# Patient Record
Sex: FEMALE | Race: WHITE | Employment: FULL TIME | URBAN - METROPOLITAN AREA
[De-identification: names, ages, dates, MRNs, and addresses within clinical notes are randomized per-mention and may not be internally consistent; named-entity substitution may affect disease eponyms.]

---

## 2017-01-09 ENCOUNTER — OFFICE VISIT (OUTPATIENT)
Dept: FAMILY MEDICINE | Facility: CLINIC | Age: 32
End: 2017-01-09
Payer: COMMERCIAL

## 2017-01-09 ENCOUNTER — TELEPHONE (OUTPATIENT)
Dept: FAMILY MEDICINE | Facility: CLINIC | Age: 32
End: 2017-01-09

## 2017-01-09 ENCOUNTER — RADIANT APPOINTMENT (OUTPATIENT)
Dept: GENERAL RADIOLOGY | Facility: CLINIC | Age: 32
End: 2017-01-09
Attending: PHYSICIAN ASSISTANT
Payer: COMMERCIAL

## 2017-01-09 VITALS
OXYGEN SATURATION: 98 % | BODY MASS INDEX: 24.59 KG/M2 | WEIGHT: 140 LBS | SYSTOLIC BLOOD PRESSURE: 114 MMHG | TEMPERATURE: 98.1 F | DIASTOLIC BLOOD PRESSURE: 78 MMHG | HEART RATE: 77 BPM

## 2017-01-09 DIAGNOSIS — J20.9 ACUTE BRONCHITIS, UNSPECIFIED ORGANISM: Primary | ICD-10-CM

## 2017-01-09 DIAGNOSIS — R05.9 COUGH: ICD-10-CM

## 2017-01-09 PROCEDURE — 71020 XR CHEST 2 VW: CPT

## 2017-01-09 PROCEDURE — 99213 OFFICE O/P EST LOW 20 MIN: CPT | Performed by: PHYSICIAN ASSISTANT

## 2017-01-09 RX ORDER — BENZONATATE 200 MG/1
200 CAPSULE ORAL 3 TIMES DAILY PRN
Qty: 21 CAPSULE | Refills: 0 | Status: SHIPPED | OUTPATIENT
Start: 2017-01-09

## 2017-01-09 RX ORDER — AZITHROMYCIN 250 MG/1
TABLET, FILM COATED ORAL
Qty: 6 TABLET | Refills: 0 | Status: SHIPPED | OUTPATIENT
Start: 2017-01-09 | End: 2017-02-22

## 2017-01-09 NOTE — TELEPHONE ENCOUNTER
See telephone encounter below. Patient has now been seen 3 times for persistent cough. Was originally seen through [facility], then here on 12/16/16, then through a [facility] last week. She was seen in Urgent Care due to developing some back pain with her cough.    Acute Illness   Acute illness concerns: cough  Onset: 6 weeks     Fever: no     Chills/Sweats: no    Headache (location?): YES    Sinus Pressure:YES    Conjunctivitis:  no    Ear Pain: YES- just feel sensative    Rhinorrhea: YES    Congestion: YES    Sore Throat: no     Cough: YES-non-productive, productive of yellow sputum, same over time    Wheeze: no    Decreased Appetite: no    Nausea: no    Vomiting: no    Diarrhea:  no    Dysuria/Freq.: no    Fatigue/Achiness: YES - cough    Sick/Strep Exposure: no     Therapies Tried and outcome: Tessalon, albuterol, prednisone, Mucinex, Dayquil, Claritin, cough drops - feels that Tessalon, Mucinex and prednisone did help temporarily, but otherwise no change     She is wondering what else she can do, or if she needs something different. Please advise. Thank you.    Nini Mares, RN, BSN  [facility] [place]

## 2017-01-09 NOTE — MR AVS SNAPSHOT
After Visit Summary   1/9/2017    Pratibha Suresh    MRN: [id number]           Patient Information     Date Of Birth          1985        Visit Information        Provider Department      1/9/2017 6:20 PM Klarissa Hu PA-C [facility]        Today's Diagnoses     Acute bronchitis, unspecified organism    -  1     Cough           Care Instructions      Bronchitis, Antibiotic Treatment (Adult)    Bronchitis is an infection of the air passages (bronchial tubes) in your lungs. It often occurs when you have a cold. This illness is contagious during the first few days and is spread through the air by coughing and sneezing, or by direct contact (touching the sick person and then touching your own eyes, nose, or mouth).  Symptoms of bronchitis include cough with mucus (phlegm) and low-grade fever. Bronchitis usually lasts 7 to 14 days. Mild cases can be treated with simple home remedies. More severe infection is treated with an antibiotic.  Home care  Follow these guidelines when caring for yourself at home:    If your symptoms are severe, rest at home for the first 2 to 3 days. When you go back to your usual activities, don't let yourself get too tired.    Do not smoke. Also avoid being exposed to secondhand smoke.    You may use over-the-counter medicines to control fever or pain, unless another medicine was prescribed. (Note: If you have chronic liver or kidney disease or have ever had a stomach ulcer or gastrointestinal bleeding, talk with your healthcare provider before using these medicines. Also talk to your provider if you are taking medicine to prevent blood clots.) Aspirin should never be given to anyone younger than 18 years of age who is ill with a viral infection or fever. It may cause severe liver or brain damage.    Your appetite may be poor, so a light diet is fine. Avoid dehydration by drinking 6 to 8 glasses of fluids per day (such as water, soft drinks, sports drinks,  juices, tea, or soup). Extra fluids will help loosen secretions in the nose and lungs.    Over-the-counter cough, cold, and sore-throat medicines will not shorten the length of the illness, but they may be helpful to reduce symptoms. (Note: Do not use decongestants if you have high blood pressure.)    Finish all antibiotic medicine. Do this even if you are feeling better after only a few days.  Follow-up care  Follow up with your healthcare provider, or as advised. If you had an X-ray or ECG (electrocardiogram), a specialist will review it. You will be notified of any new findings that may affect your care.  Note: If you are age 65 or older, or if you have a chronic lung disease or condition that affects your immune system, or you smoke, talk to your healthcare provider about having pneumococcal vaccinations and a yearly influenza vaccination (flu shot).  When to seek medical advice  Call your healthcare provider right away if any of these occur:    Fever of 100.4 F (38 C) or higher    Coughing up increased amounts of colored sputum    Weakness, drowsiness, headache, facial pain, ear pain, or a stiff neck   Call 911, or get immediate medical care  Contact emergency services right away if any of these occur.    Coughing up blood    Worsening weakness, drowsiness, headache, or stiff neck    Trouble breathing, wheezing, or pain with breathing    0401-7924 [employer]. [address] [place] [address]. All rights reserved. This information is not intended as a substitute for professional medical care. Always follow your healthcare professional's instructions.              Follow-ups after your visit        Who to contact     If you have questions or need follow up information about today's clinic visit or your schedule please contact [facility] [place] directly at [phone].  Normal or non-critical lab and imaging results will be communicated to you by MyChart, letter or phone within 4  "business days after the clinic has received the results. If you do not hear from us within 7 days, please contact the clinic through [employer] or phone. If you have a critical or abnormal lab result, we will notify you by phone as soon as possible.  Submit refill requests through [employer] or call your pharmacy and they will forward the refill request to us. Please allow 3 business days for your refill to be completed.          Additional Information About Your Visit        uControl Information     uControl lets you send messages to your doctor, view your test results, renew your prescriptions, schedule appointments and more. To sign up, go to [email or web address] . Click on \"Log in\" on the left side of the screen, which will take you to the Welcome page. Then click on \"Sign up Now\" on the right side of the page.     You will be asked to enter the access code listed below, as well as some personal information. Please follow the directions to create your username and password.     Your access code is: [email or web address]  Expires: 3/16/2017  8:45 AM     Your access code will  in 90 days. If you need help or a new code, please call your [place] clinic or [phone].        Care EveryWhere ID     This is your Care EveryWhere ID. This could be used by other organizations to access your [place] medical records  [id number]        Your Vitals Were     Pulse Temperature Pulse Oximetry             77 98.1  F (36.7  C) (Oral) 98%          Blood Pressure from Last 3 Encounters:   17 114/78   16 110/62   16 118/72    Weight from Last 3 Encounters:   17 140 lb (63.504 kg)   16 137 lb (62.143 kg)   16 131 lb 12.8 oz (59.784 kg)                 Today's Medication Changes          These changes are accurate as of: 17  7:06 PM.  If you have any questions, ask your nurse or doctor.               Start taking these medicines.        Dose/Directions    azithromycin 250 MG tablet   Commonly " known as:  ZITHROMAX   Used for:  Acute bronchitis, unspecified organism   Started by:  Klarissa Hu PA-C        Two tablets first day, then one tablet daily for four days.   Quantity:  6 tablet   Refills:  0         These medicines have changed or have updated prescriptions.        Dose/Directions    * benzonatate 100 MG capsule   Commonly known as:  TESSALON   This may have changed:  Another medication with the same name was added. Make sure you understand how and when to take each.   Changed by:  Klarissa Hu PA-C        SWALLOW WHOLE 1 CAPSULE BY MOUTH 3 TIMES A DAY AS NEEDED. DO NOT BREAK,CHEW, DISSOLVE,CUT OR CRUSH   Refills:  0       * benzonatate 200 MG capsule   Commonly known as:  TESSALON   This may have changed:  You were already taking a medication with the same name, and this prescription was added. Make sure you understand how and when to take each.   Used for:  Acute bronchitis, unspecified organism   Changed by:  Klarissa Hu PA-C        Dose:  200 mg   Take 1 capsule (200 mg) by mouth 3 times daily as needed for cough   Quantity:  21 capsule   Refills:  0       * Notice:  This list has 2 medication(s) that are the same as other medications prescribed for you. Read the directions carefully, and ask your doctor or other care provider to review them with you.         Where to get your medicines      These medications were sent to [facility] [address] [place] [facility] [place] [place] [facility] [address] [facility] [address]  [address] [facility] [address], [place] [address]     Phone:  [phone]    - azithromycin 250 MG tablet  - benzonatate 200 MG capsule             Primary Care Provider Office Phone # Fax #    Karen Weiler, [phone]       [facility] [address] [facility] [address]        Thank you!     Thank you for choosing [facility]  for your care. Our goal is always to provide you with excellent care. Hearing back from our patients is one way we can continue to improve our  services. Please take a few minutes to complete the written survey that you may receive in the mail after your visit with us. Thank you!             Your Updated Medication List - Protect others around you: Learn how to safely use, store and throw away your medicines at www.disposemymeds.org.          This list is accurate as of: 1/9/17  7:06 PM.  Always use your most recent med list.                   Brand Name Dispense Instructions for use    azithromycin 250 MG tablet    ZITHROMAX    6 tablet    Two tablets first day, then one tablet daily for four days.       * benzonatate 100 MG capsule    TESSALON     SWALLOW WHOLE 1 CAPSULE BY MOUTH 3 TIMES A DAY AS NEEDED. DO NOT BREAK,CHEW, DISSOLVE,CUT OR CRUSH       * benzonatate 200 MG capsule    TESSALON    21 capsule    Take 1 capsule (200 mg) by mouth 3 times daily as needed for cough       citalopram 20 MG tablet    celeXA    90 tablet    Take 1.5 tablets (30 mg) by mouth daily       ENSKYCE 0.15-30 MG-MCG per tablet   Generic drug:  desogestrel-ethinyl estradiol      Take 1 tablet by mouth daily       EPINEPHrine 0.3 MG/0.3ML injection     1 each    Inject 0.3 mLs (0.3 mg) into the muscle once as needed       LORazepam 0.5 MG tablet    ATIVAN    20 tablet    Take 1 tablet (0.5 mg) by mouth every 8 hours as needed for anxiety       MULTIVITAMIN PO          omeprazole 40 MG capsule    priLOSEC    90 capsule    Take 1 capsule (40 mg) by mouth daily       valACYclovir 500 MG tablet    VALTREX     TAKE 1 TABLET BY ORAL ROUTE DAILY       VENTOLIN  (90 BASE) MCG/ACT Inhaler   Generic drug:  albuterol      INHALE 1-2 INHALATIONS EVERY 4-6 HOURS AS NEEDED FOR WHEEZING.       * Notice:  This list has 2 medication(s) that are the same as other medications prescribed for you. Read the directions carefully, and ask your doctor or other care provider to review them with you.

## 2017-01-09 NOTE — TELEPHONE ENCOUNTER
Left a detailed voicemail for patient advising of CHUCK PEREZ's message below. Awaiting call back.  Nini Mares RN, BSN  [facility]

## 2017-01-09 NOTE — TELEPHONE ENCOUNTER
Was a chest x-ray done when she went to urgent care last week? If not, I would advise getting a chest x-ray. Was she given any other treatments at Urgent Care?    Would be a candidate for treatment with azithromycin if this has not been done yet and if she had a normal chest x-ray.    Klarissa Hu PA-C

## 2017-01-09 NOTE — TELEPHONE ENCOUNTER
.  Name of caller:   Pratibha  Relationship to pt:  self  Reason for call:   Pt calling she has been seen twice for a cough and she still having problems wondering if she can get something else   Best phone number to reach pt at is:   [phone]  Ok to leave a message with medical info?   yes  Pharmacy preferred (if calling for a refill)   [facility] in [place]

## 2017-01-09 NOTE — TELEPHONE ENCOUNTER
Patient calling back. She reports that she has not had a CXR done at this point. Advised that this would be the next step. Appointment scheduled for today at 6:20pm with CHUCK PEREZ.    Patient verbalized understanding and agrees with plan.    Will call back if further questions or concerns.    Nini Mares RN, BSN

## 2017-01-10 NOTE — NURSING NOTE
"Chief Complaint   Patient presents with     Cough       Initial /78 mmHg  Pulse 77  Temp(Src) 98.1  F (36.7  C) (Oral)  Wt 140 lb (63.504 kg)  SpO2 98% Estimated body mass index is 24.59 kg/(m^2) as calculated from the following:    Height as of 2/22/16: 5' 3.25\" (1.607 m).    Weight as of this encounter: 140 lb (63.504 kg).  BP completed using cuff size: regular    Prudence Ricci MA      "

## 2017-01-10 NOTE — PROGRESS NOTES
SUBJECTIVE:                                                    Pratibha Suresh is a 31 year old female who presents to clinic today for the following health issues:    Follow up for cough - was better for a few days and then came back    Patient evaluated on 12/16/16 for cough. See last office visit documentation.  She was put on prednisone to take along with albuterol and Tessalon.  Seen at an outside urgent care last week. Chest x-ray was not done at that time  She noticed slight improvement in her cough for a few days, so she stopped using her albuterol.  Cough now back to the way it was.  No longer having the severe rib pain that she was experiencing. Is having some discomfort across her chest and in her back. Has been seeing a chiropractor    Has also had some sinus symptoms since last office visit. Was reporting significant nasal congestion. Is not currently feeling as congested now as she was.    Still using Tessalon, which helps some with her cough  Denies any shortness of breath    Cough is productive of yellow sputum. Symptoms tend to be worst in the morning    Denies any recent fevers    Problem list and histories reviewed & adjusted, as indicated.  Additional history: as documented    Patient Active Problem List   Diagnosis     Status post induction of labor     Normal labor     Vaginal delivery     Anxiety     Past Surgical History   Procedure Laterality Date     Laparoscopic appendectomy       [place] teeth[       Esophagoscopy, gastroscopy, duodenoscopy (egd), combined  5/12/2014     Procedure: COMBINED ESOPHAGOSCOPY, GASTROSCOPY, DUODENOSCOPY (EGD), BIOPSY SINGLE OR MULTIPLE;  Surgeon: Armaan Jeong MD;  Location: [facility]       Social History   Substance Use Topics     Smoking status: Former Smoker     Quit date: 01/01/2007     Smokeless tobacco: Never Used     Alcohol Use: No     Family History   Problem Relation Age of Onset     Depression Mother      DIABETES Maternal Grandfather           Current Outpatient Prescriptions   Medication Sig Dispense Refill     azithromycin (ZITHROMAX) 250 MG tablet Two tablets first day, then one tablet daily for four days. 6 tablet 0     benzonatate (TESSALON) 200 MG capsule Take 1 capsule (200 mg) by mouth 3 times daily as needed for cough 21 capsule 0     benzonatate (TESSALON) 100 MG capsule SWALLOW WHOLE 1 CAPSULE BY MOUTH 3 TIMES A DAY AS NEEDED. DO NOT BREAK,CHEW, DISSOLVE,CUT OR CRUSH  0     VENTOLIN  (90 BASE) MCG/ACT Inhaler INHALE 1-2 INHALATIONS EVERY 4-6 HOURS AS NEEDED FOR WHEEZING.  0     valACYclovir (VALTREX) 500 MG tablet TAKE 1 TABLET BY ORAL ROUTE DAILY  4     citalopram (CELEXA) 20 MG tablet Take 1.5 tablets (30 mg) by mouth daily 90 tablet 2     omeprazole (PRILOSEC) 40 MG capsule Take 1 capsule (40 mg) by mouth daily 90 capsule 2     ENSKYCE 0.15-30 MG-MCG per tablet Take 1 tablet by mouth daily       Multiple Vitamins-Minerals (MULTIVITAMIN PO)        LORazepam (ATIVAN) 0.5 MG tablet Take 1 tablet (0.5 mg) by mouth every 8 hours as needed for anxiety 20 tablet 0     EPINEPHrine (EPIPEN) 0.3 MG/0.3ML injection Inject 0.3 mLs (0.3 mg) into the muscle once as needed 1 each 1     Allergies   Allergen Reactions     Bees        ROS:  Constitutional, HEENT, cardiovascular, pulmonary, gi and gu systems are negative, except as otherwise noted.    OBJECTIVE:                                                    /78 mmHg  Pulse 77  Temp(Src) 98.1  F (36.7  C) (Oral)  Wt 140 lb (63.504 kg)  SpO2 98%  Body mass index is 24.59 kg/(m^2).  GENERAL: healthy, alert and no distress  EYES: Eyes grossly normal to inspection, PERRL and conjunctivae and sclerae normal  HENT: ear canals and TM's normal, nose and mouth without ulcers or lesions  NECK: cervical adenopathy R anterior, no asymmetry, masses, or scars and thyroid normal to palpation  RESP: lungs clear to auscultation - no rales, rhonchi or wheezes  CV: regular rate and rhythm, normal S1 S2,  no S3 or S4, no murmur, click or rub, no peripheral edema and peripheral pulses strong  MS: no gross musculoskeletal defects noted, no edema  SKIN: no suspicious lesions or rashes    Diagnostic Test Results:  CXR - negative     ASSESSMENT/PLAN:                                                      1. Acute bronchitis, unspecified organism  Will start antibiotic, given length of symptoms. Will also refill Tessalon. Advised patient of potential interaction between azithromycin and Celexa. Continue with albuterol PRN. Also discussed use of Mucinex. Follow-up if no improvement with treatment.  - azithromycin (ZITHROMAX) 250 MG tablet; Two tablets first day, then one tablet daily for four days.  Dispense: 6 tablet; Refill: 0  - benzonatate (TESSALON) 200 MG capsule; Take 1 capsule (200 mg) by mouth 3 times daily as needed for cough  Dispense: 21 capsule; Refill: 0    2. Cough  - XR Chest 2 Views; Future    See Patient Instructions    Klarissa Hu PA-C  [facility]

## 2017-02-22 DIAGNOSIS — F41.9 ANXIETY: ICD-10-CM

## 2017-02-22 RX ORDER — CITALOPRAM HYDROBROMIDE 20 MG/1
30 TABLET ORAL DAILY
Qty: 30 TABLET | Refills: 0 | Status: CANCELLED | OUTPATIENT
Start: 2017-02-22

## 2017-02-22 RX ORDER — CITALOPRAM HYDROBROMIDE 20 MG/1
30 TABLET ORAL DAILY
Qty: 45 TABLET | Refills: 0 | Status: SHIPPED | OUTPATIENT
Start: 2017-02-22 | End: 2017-03-16

## 2017-02-22 NOTE — TELEPHONE ENCOUNTER
citalopram (CELEXA) 20 MG tablet     Last Written Prescription Date: 7/8/2016  Last Fill Quantity: 90 tablet, # refills: 2  Last Office Visit with [facility] primary care provider:  1/9/2017        Last PHQ-9 score on record=   PHQ-9 SCORE 2/23/2016   Total Score -   Total Score 8

## 2017-02-22 NOTE — TELEPHONE ENCOUNTER
Routing refill request to provider for review/approval because:  Drug interaction warning  Patient needs to be seen because it has been more than 1 year since last office visit.      Sulema Murrieta R.N.

## 2017-02-22 NOTE — TELEPHONE ENCOUNTER
Prescription faxed in. Please have patient make an appointment for a  Medication check either with me or Kate Karen Weiler, MD

## 2017-02-23 NOTE — TELEPHONE ENCOUNTER
[place] of [place] - no longer needs.    [facility]

## 2017-03-16 DIAGNOSIS — F41.9 ANXIETY: ICD-10-CM

## 2017-03-16 NOTE — TELEPHONE ENCOUNTER
citalopram (CELEXA) 20 MG tablet  Last Written Prescription Date: 2-  Last Fill Quantity: 45, # refills: 0  Last Office Visit with [facility] primary care provider:  1-9-2017        Last PHQ-9 score on record=   PHQ-9 SCORE 2/23/2016   Total Score -   Total Score 8

## 2017-03-17 RX ORDER — CITALOPRAM HYDROBROMIDE 20 MG/1
30 TABLET ORAL DAILY
Qty: 45 TABLET | Refills: 0 | Status: SHIPPED
Start: 2017-03-17 | End: 2017-03-30

## 2017-03-17 NOTE — TELEPHONE ENCOUNTER
Per previous encounter patient has moved out of state. If this is not the case per that encounter patient needs appointment for medication check.   RX denied. Sulema Murrieta R.N.

## 2017-03-20 DIAGNOSIS — K21.9 ESOPHAGEAL REFLUX: Primary | ICD-10-CM

## 2017-03-20 NOTE — TELEPHONE ENCOUNTER
omeprazole (PRILOSEC) 40 MG capsule      Last Written Prescription Date: 6/22/2016  Last Fill Quantity: 90 capsule,  # refills: 2   Last Office Visit with [facility], [facility] or [facility] prescribing provider: 1/9/2017

## 2017-03-22 RX ORDER — OMEPRAZOLE 40 MG/1
40 CAPSULE, DELAYED RELEASE ORAL DAILY
Qty: 90 CAPSULE | Refills: 2 | Status: SHIPPED | OUTPATIENT
Start: 2017-03-22 | End: 2017-12-31

## 2017-03-22 NOTE — TELEPHONE ENCOUNTER
Refill approved through [facility] protocol.  Sharla Jones RN  [facility]  [phone]

## 2017-03-30 ENCOUNTER — MYC MEDICAL ADVICE (OUTPATIENT)
Dept: FAMILY MEDICINE | Facility: CLINIC | Age: 32
End: 2017-03-30

## 2017-03-30 DIAGNOSIS — F41.9 ANXIETY: ICD-10-CM

## 2017-03-30 RX ORDER — CITALOPRAM HYDROBROMIDE 20 MG/1
10 TABLET ORAL DAILY
Qty: 7 TABLET | Refills: 0 | Status: SHIPPED | OUTPATIENT
Start: 2017-03-30

## 2017-03-30 NOTE — TELEPHONE ENCOUNTER
Please see [employer] message. Are you able to review and advise in MD YASMINE's absence?  Nini Mraes, RN, BSN  [facility]

## 2017-06-05 DIAGNOSIS — Z91.030 HISTORY OF BEE STING ALLERGY: ICD-10-CM

## 2017-06-05 NOTE — TELEPHONE ENCOUNTER
lidocaine-EPINEPHrine 1.5 %-1:200000 injection      Last Written Prescription Date: 3.9.15  Last Fill Quantity: 0,  # refills: 0   Last Office Visit with [facility], P or [facility] prescribing provider: 10.5.15

## 2017-06-06 RX ORDER — EPINEPHRINE 0.3 MG/.3ML
0.3 INJECTION SUBCUTANEOUS
Qty: 0.3 ML | Refills: 1 | Status: SHIPPED | OUTPATIENT
Start: 2017-06-06

## 2017-10-22 ENCOUNTER — HEALTH MAINTENANCE LETTER (OUTPATIENT)
Age: 32
End: 2017-10-22

## 2017-11-29 ENCOUNTER — TELEPHONE (OUTPATIENT)
Dept: FAMILY MEDICINE | Facility: CLINIC | Age: 32
End: 2017-11-29

## 2017-11-29 NOTE — TELEPHONE ENCOUNTER
11/29/2017    Call Regarding Preventive Health Screening Cervical/PAP    Attempt 1    Message VM Full    Comments:       Outreach   CC

## 2017-12-20 NOTE — TELEPHONE ENCOUNTER
12/20/2017    Call Regarding Preventive Health Screening Cervical/PAP    Attempt 2    Message     Comments: MARIA A FULL          Outreach   AT

## 2017-12-29 NOTE — TELEPHONE ENCOUNTER
12/29/2017    Call Regarding Preventive Health Screening Cervical/PAP    Attempt 3    Message on voicemail    Comments:       Outreach   Reva Berkowitz

## 2020-03-01 ENCOUNTER — HEALTH MAINTENANCE LETTER (OUTPATIENT)
Age: 35
End: 2020-03-01

## 2020-12-13 ENCOUNTER — HEALTH MAINTENANCE LETTER (OUTPATIENT)
Age: 35
End: 2020-12-13

## 2021-04-17 ENCOUNTER — HEALTH MAINTENANCE LETTER (OUTPATIENT)
Age: 36
End: 2021-04-17

## 2021-10-01 ENCOUNTER — LAB REQUISITION (OUTPATIENT)
Dept: LAB | Age: 36
End: 2021-10-01

## 2021-10-01 DIAGNOSIS — J02.9 ACUTE PHARYNGITIS, UNSPECIFIED: ICD-10-CM

## 2021-10-01 PROCEDURE — U0005 INFEC AGEN DETEC AMPLI PROBE: HCPCS | Performed by: CLINICAL MEDICAL LABORATORY

## 2021-10-01 PROCEDURE — U0003 INFECTIOUS AGENT DETECTION BY NUCLEIC ACID (DNA OR RNA); SEVERE ACUTE RESPIRATORY SYNDROME CORONAVIRUS 2 (SARS-COV-2) (CORONAVIRUS DISEASE [COVID-19]), AMPLIFIED PROBE TECHNIQUE, MAKING USE OF HIGH THROUGHPUT TECHNOLOGIES AS DESCRIBED BY CMS-2020-01-R: HCPCS | Performed by: CLINICAL MEDICAL LABORATORY

## 2021-10-01 PROCEDURE — PSEU10635 2019 NOVEL CORONAVIRUS (SARS-COV-2): Performed by: CLINICAL MEDICAL LABORATORY

## 2021-10-02 ENCOUNTER — HEALTH MAINTENANCE LETTER (OUTPATIENT)
Age: 36
End: 2021-10-02

## 2021-10-02 LAB
SARS-COV-2 RNA RESP QL NAA+PROBE: NOT DETECTED
SERVICE CMNT-IMP: NORMAL
SERVICE CMNT-IMP: NORMAL

## 2022-05-08 ENCOUNTER — HEALTH MAINTENANCE LETTER (OUTPATIENT)
Age: 37
End: 2022-05-08

## 2023-01-14 ENCOUNTER — HEALTH MAINTENANCE LETTER (OUTPATIENT)
Age: 38
End: 2023-01-14

## 2023-04-26 ENCOUNTER — LAB REQUISITION (OUTPATIENT)
Dept: LAB | Age: 38
End: 2023-04-26

## 2023-04-26 DIAGNOSIS — T78.2XXA ANAPHYLACTIC SHOCK, UNSPECIFIED, INITIAL ENCOUNTER: ICD-10-CM

## 2023-04-26 PROCEDURE — PSEU9171 ALLERGEN: EGG WHITE, IGE: Performed by: CLINICAL MEDICAL LABORATORY

## 2023-04-26 PROCEDURE — 86003 ALLG SPEC IGE CRUDE XTRC EA: CPT | Performed by: CLINICAL MEDICAL LABORATORY

## 2023-04-26 PROCEDURE — PSEU9155 ALLERGEN: COCONUT, IGE: Performed by: CLINICAL MEDICAL LABORATORY

## 2023-04-26 PROCEDURE — PSEU9135 ALLERGEN: CASHEW, IGE: Performed by: CLINICAL MEDICAL LABORATORY

## 2023-04-26 PROCEDURE — 86008 ALLG SPEC IGE RECOMB EA: CPT | Performed by: CLINICAL MEDICAL LABORATORY

## 2023-04-26 PROCEDURE — PSEU10464 ALLERGEN: COW MILK COMPONENTS, IGE: Performed by: CLINICAL MEDICAL LABORATORY

## 2023-04-26 PROCEDURE — PSEU9187 ALLERGEN: GARLIC, IGE: Performed by: CLINICAL MEDICAL LABORATORY

## 2023-04-26 PROCEDURE — PSEU9243 ALLERGEN: ONION, IGE: Performed by: CLINICAL MEDICAL LABORATORY

## 2023-04-26 PROCEDURE — PSEU9299 ALLERGEN: SOYBEAN, IGE: Performed by: CLINICAL MEDICAL LABORATORY

## 2023-04-26 PROCEDURE — PSEU9173 ALLERGEN: EGG YOLK, IGE: Performed by: CLINICAL MEDICAL LABORATORY

## 2023-04-30 LAB
A-LACTALB IGE QN: <0.1 KU/L
B-LACTOGLOB IGE QN: <0.1 KU/L
CASEIN IGE QN: <0.1 KU/L
CASHEW NUT IGE QN: <0.35 KU/L
COCONUT IGE QN: <0.35 KU/L
DEPRECATED CASHEW NUT IGE RAST QL: NORMAL
DEPRECATED COCONUT IGE RAST QL: NORMAL
DEPRECATED EGG WHITE IGE RAST QL: NORMAL
DEPRECATED EGG YOLK IGE RAST QL: NORMAL
DEPRECATED GARLIC IGE RAST QL: NORMAL
DEPRECATED ONION IGE RAST QL: NORMAL
DEPRECATED SOYBEAN IGE RAST QL: NORMAL
EGG WHITE IGE QN: <0.35 KU/L
EGG YOLK IGE QN: <0.35 KU/L
GARLIC IGE QN: <0.35 KU/L
ONION IGE QN: <0.35 KU/L
SOYBEAN IGE QN: <0.35 KU/L

## 2023-06-02 ENCOUNTER — HEALTH MAINTENANCE LETTER (OUTPATIENT)
Age: 38
End: 2023-06-02

## 2023-11-14 ENCOUNTER — LAB REQUISITION (OUTPATIENT)
Dept: LAB | Age: 38
End: 2023-11-14

## 2023-11-14 DIAGNOSIS — Z12.4 ENCOUNTER FOR SCREENING FOR MALIGNANT NEOPLASM OF CERVIX: ICD-10-CM

## 2023-11-14 DIAGNOSIS — Z11.51 ENCOUNTER FOR SCREENING FOR HUMAN PAPILLOMAVIRUS (HPV): ICD-10-CM

## 2023-11-14 PROCEDURE — 87624 HPV HI-RISK TYP POOLED RSLT: CPT | Performed by: CLINICAL MEDICAL LABORATORY

## 2023-11-14 PROCEDURE — 88175 CYTOPATH C/V AUTO FLUID REDO: CPT | Performed by: CLINICAL MEDICAL LABORATORY

## 2023-11-14 PROCEDURE — PSEU9939 HPV, HIGH RISK: Performed by: CLINICAL MEDICAL LABORATORY

## 2023-11-20 LAB
CASE RPRT: NORMAL
CLINICAL INFO: NORMAL
CYTOLOGY CVX/VAG STUDY: NORMAL
HPV16+18+45 E6+E7MRNA CVX NAA+PROBE: NEGATIVE
Lab: NORMAL
PAP EDUCATIONAL NOTE: NORMAL
SPECIMEN ADEQUACY: NORMAL

## 2024-03-06 NOTE — PATIENT INSTRUCTIONS
Bronchitis, Antibiotic Treatment (Adult)    Bronchitis is an infection of the air passages (bronchial tubes) in your lungs. It often occurs when you have a cold. This illness is contagious during the first few days and is spread through the air by coughing and sneezing, or by direct contact (touching the sick person and then touching your own eyes, nose, or mouth).  Symptoms of bronchitis include cough with mucus (phlegm) and low-grade fever. Bronchitis usually lasts 7 to 14 days. Mild cases can be treated with simple home remedies. More severe infection is treated with an antibiotic.  Home care  Follow these guidelines when caring for yourself at home:    If your symptoms are severe, rest at home for the first 2 to 3 days. When you go back to your usual activities, don't let yourself get too tired.    Do not smoke. Also avoid being exposed to secondhand smoke.    You may use over-the-counter medicines to control fever or pain, unless another medicine was prescribed. (Note: If you have chronic liver or kidney disease or have ever had a stomach ulcer or gastrointestinal bleeding, talk with your healthcare provider before using these medicines. Also talk to your provider if you are taking medicine to prevent blood clots.) Aspirin should never be given to anyone younger than 18 years of age who is ill with a viral infection or fever. It may cause severe liver or brain damage.    Your appetite may be poor, so a light diet is fine. Avoid dehydration by drinking 6 to 8 glasses of fluids per day (such as water, soft drinks, sports drinks, juices, tea, or soup). Extra fluids will help loosen secretions in the nose and lungs.    Over-the-counter cough, cold, and sore-throat medicines will not shorten the length of the illness, but they may be helpful to reduce symptoms. (Note: Do not use decongestants if you have high blood pressure.)    Finish all antibiotic medicine. Do this even if you are feeling better after only a  few days.  Follow-up care  Follow up with your healthcare provider, or as advised. If you had an X-ray or ECG (electrocardiogram), a specialist will review it. You will be notified of any new findings that may affect your care.  Note: If you are age 65 or older, or if you have a chronic lung disease or condition that affects your immune system, or you smoke, talk to your healthcare provider about having pneumococcal vaccinations and a yearly influenza vaccination (flu shot).  When to seek medical advice  Call your healthcare provider right away if any of these occur:    Fever of 100.4 F (38 C) or higher    Coughing up increased amounts of colored sputum    Weakness, drowsiness, headache, facial pain, ear pain, or a stiff neck   Call 911, or get immediate medical care  Contact emergency services right away if any of these occur.    Coughing up blood    Worsening weakness, drowsiness, headache, or stiff neck    Trouble breathing, wheezing, or pain with breathing    9211-2079 [employer]. [address] [place] [address], [place] [address]. All rights reserved. This information is not intended as a substitute for professional medical care. Always follow your healthcare professional's instructions.         36.6

## 2024-05-17 ENCOUNTER — LAB REQUISITION (OUTPATIENT)
Dept: LAB | Age: 39
End: 2024-05-17

## 2024-05-17 DIAGNOSIS — Z34.80 ENCOUNTER FOR SUPERVISION OF OTHER NORMAL PREGNANCY, UNSPECIFIED TRIMESTER (CMD): ICD-10-CM

## 2024-05-17 PROCEDURE — 87653 STREP B DNA AMP PROBE: CPT | Performed by: CLINICAL MEDICAL LABORATORY

## 2024-05-17 PROCEDURE — PSEU9032 STREPTOCOCCUS GROUP B PCR: Performed by: CLINICAL MEDICAL LABORATORY

## 2024-05-21 LAB — GP B STREP SPEC QL CULT: NOT DETECTED

## 2024-06-07 PROCEDURE — PSEU8563 TREPONEMA PALLIDUM ANTIBODY IGG AND IGM: Performed by: CLINICAL MEDICAL LABORATORY

## 2024-06-07 PROCEDURE — 86780 TREPONEMA PALLIDUM: CPT | Performed by: CLINICAL MEDICAL LABORATORY

## 2024-06-08 ENCOUNTER — LAB REQUISITION (OUTPATIENT)
Dept: LAB | Age: 39
End: 2024-06-08

## 2024-06-09 LAB — T PALLIDUM IGG SER QL IA: NONREACTIVE

## 2025-04-08 ENCOUNTER — LAB REQUISITION (OUTPATIENT)
Dept: LAB | Age: 40
End: 2025-04-08

## 2025-04-08 DIAGNOSIS — Z00.00 ENCOUNTER FOR GENERAL ADULT MEDICAL EXAMINATION WITHOUT ABNORMAL FINDINGS: ICD-10-CM

## 2025-04-08 LAB — HBA1C MFR BLD: 5.3 % (ref 4.5–5.6)

## 2025-04-08 PROCEDURE — 83036 HEMOGLOBIN GLYCOSYLATED A1C: CPT | Performed by: CLINICAL MEDICAL LABORATORY

## 2025-04-08 PROCEDURE — PSEU8266 GLYCOHEMOGLOBIN: Performed by: CLINICAL MEDICAL LABORATORY
